# Patient Record
Sex: MALE | Race: WHITE | NOT HISPANIC OR LATINO | Employment: OTHER | URBAN - METROPOLITAN AREA
[De-identification: names, ages, dates, MRNs, and addresses within clinical notes are randomized per-mention and may not be internally consistent; named-entity substitution may affect disease eponyms.]

---

## 2022-04-06 ENCOUNTER — OFFICE VISIT (OUTPATIENT)
Dept: ENDOCRINOLOGY | Facility: HOSPITAL | Age: 82
End: 2022-04-06
Payer: MEDICARE

## 2022-04-06 VITALS
HEART RATE: 65 BPM | OXYGEN SATURATION: 98 % | BODY MASS INDEX: 29.12 KG/M2 | DIASTOLIC BLOOD PRESSURE: 72 MMHG | HEIGHT: 72 IN | WEIGHT: 215 LBS | SYSTOLIC BLOOD PRESSURE: 118 MMHG

## 2022-04-06 DIAGNOSIS — E55.9 VITAMIN D DEFICIENCY: ICD-10-CM

## 2022-04-06 DIAGNOSIS — N20.0 RENAL STONES: ICD-10-CM

## 2022-04-06 DIAGNOSIS — E34.9 ELEVATED PARATHYROID HORMONE: Primary | ICD-10-CM

## 2022-04-06 DIAGNOSIS — E21.3 HYPERPARATHYROIDISM, UNSPECIFIED (HCC): ICD-10-CM

## 2022-04-06 PROBLEM — R79.89 ELEVATED PARATHYROID HORMONE: Status: ACTIVE | Noted: 2022-04-06

## 2022-04-06 PROCEDURE — 99205 OFFICE O/P NEW HI 60 MIN: CPT | Performed by: INTERNAL MEDICINE

## 2022-04-06 RX ORDER — HYDROXYCHLOROQUINE SULFATE 200 MG/1
200 TABLET, FILM COATED ORAL 2 TIMES DAILY
COMMUNITY
Start: 2022-03-25

## 2022-04-06 RX ORDER — PREDNISONE 1 MG/1
10 TABLET ORAL
COMMUNITY

## 2022-04-06 RX ORDER — ATORVASTATIN CALCIUM 10 MG/1
10 TABLET, FILM COATED ORAL DAILY
COMMUNITY
Start: 2022-01-21

## 2022-04-06 RX ORDER — UBIDECARENONE 100 MG
CAPSULE ORAL DAILY
COMMUNITY

## 2022-04-06 RX ORDER — LISINOPRIL 20 MG/1
20 TABLET ORAL 2 TIMES DAILY
COMMUNITY
Start: 2022-03-07

## 2022-04-06 RX ORDER — APIXABAN 5 MG/1
5 TABLET, FILM COATED ORAL 2 TIMES DAILY
COMMUNITY
Start: 2022-04-01

## 2022-04-06 RX ORDER — POTASSIUM CHLORIDE 1500 MG/1
TABLET, FILM COATED, EXTENDED RELEASE ORAL
COMMUNITY
Start: 2022-02-23

## 2022-04-06 RX ORDER — FUROSEMIDE 40 MG/1
40 TABLET ORAL 2 TIMES DAILY
COMMUNITY
Start: 2022-03-24

## 2022-04-06 RX ORDER — NIFEDIPINE 30 MG/1
30 TABLET, EXTENDED RELEASE ORAL DAILY
COMMUNITY
Start: 2022-02-23

## 2022-04-06 RX ORDER — GABAPENTIN 100 MG/1
200 CAPSULE ORAL 2 TIMES DAILY
COMMUNITY
Start: 2022-01-21

## 2022-04-06 NOTE — PATIENT INSTRUCTIONS
Let's do the repeat blood work now  Let's do a 24 hour urine calcium  Let's do a dexa scan to check if the bones are weak  Follow up to be determined

## 2022-04-06 NOTE — PROGRESS NOTES
4/7/2022    Assessment/Plan      Diagnoses and all orders for this visit:    Elevated parathyroid hormone  -     Comprehensive metabolic panel Lab Collect  -     Phosphorus Lab Collect  -     PTH, intact Lab Collect Lab Collect  -     Vitamin D 25 hydroxy Lab Collect  -     Calcium, urine, 24 hour Lab Collect; Future  -     Creatinine, urine, 24 hour- Lab Collect; Future  -     DXA bone density spine hip and pelvis; Future    Vitamin D deficiency  -     Comprehensive metabolic panel Lab Collect  -     Phosphorus Lab Collect  -     PTH, intact Lab Collect Lab Collect  -     Vitamin D 25 hydroxy Lab Collect  -     Calcium, urine, 24 hour Lab Collect; Future  -     Creatinine, urine, 24 hour- Lab Collect; Future  -     DXA bone density spine hip and pelvis; Future    Renal stones  -     Comprehensive metabolic panel Lab Collect  -     Phosphorus Lab Collect  -     PTH, intact Lab Collect Lab Collect  -     Vitamin D 25 hydroxy Lab Collect  -     Calcium, urine, 24 hour Lab Collect; Future  -     Creatinine, urine, 24 hour- Lab Collect; Future    [facility], [facility] ([facility] )   -     DXA bone density spine hip and pelvis; Future    Other orders  -     Eliquis 5 MG; Take 5 mg by mouth 2 (two) times a day  -     atorvastatin (LIPITOR) 10 mg tablet; Take 10 mg by mouth daily  -     Cholecalciferol 25 MCG (1000 UT) tablet; Take 1,000 Units by mouth daily  -     Chromium 500 MCG TABS; Take by mouth  -     co-enzyme Q-10 100 mg capsule; Take by mouth daily  -     cyanocobalamin (VITAMIN B-12) 1000 MCG tablet; Take 1,000 mcg by mouth daily  -     furosemide (LASIX) 40 mg tablet; Take 40 mg by mouth 2 (two) times a day  -     gabapentin (NEURONTIN) 100 mg capsule; Take 200 mg by mouth 2 (two) times a day  -     hydroxychloroquine (PLAQUENIL) 200 mg tablet; Take 200 mg by mouth 2 (two) times a day  -     lisinopril (ZESTRIL) 20 mg tablet;  Take 20 mg by mouth 2 (two) times a day  -     NIFEdipine (PROCARDIA XL) 30 mg 24 hr tablet; Take 30 mg by mouth daily  -     Potassium Chloride ER 20 MEQ TBCR; Once daily  -     PUMPKIN SEED PO; Take by mouth  -     Apoaequorin (PREVAGEN PO); Take by mouth  -     predniSONE 5 mg tablet; Take 10 mg by mouth daily at bedtime        Assessment/Plan:  1  Elevated parathyroid hormone level  He has an elevated parathyroid hormone level with normal phosphorus and calcium  He could have secondary hyperparathyroidism due to vitamin-D deficiency  He could also have normocalcemic hyperparathyroidism  He does have a history of renal stones which could be partly due to his elevated parathyroid hormone level especially if his calcium excretion in his urine is elevated  To further evaluate this situation, I will do some blood work now consisting of a parathyroid hormone level, 25 hydroxy vitamin-D level, CMP, and phosphorus  I will also have him do a 24 hour urine for urinary free calcium  I will also have him do a DEXA scan given his kyphosis  2  Vitamin-D deficiency  He reportedly had a low vitamin-D level but I do not have those results  I will try to get those results  I will also have him get a 25 hydroxy vitamin-D level done now  3  History of renal stones  He has had 1 renal stone in the past   I will do a 24 hour urine for urinary free calcium  He will get his blood work done now along with his 24 hour urine and DEXA scan  Once the studies are obtained, I will then determine follow-up and further treatment  CC:  Parathyroid consult    History of Present Illness     HPI: Katheryn Heredia is a 78y o  year old male with history of elevated parathyroid hormone level here for evaluation and consult  He reports that he has a history of arthralgias and chest aches and swelling in his hands and legs and was seen by Rheumatology  Blood work was done to evaluate since he was placed on prednisone and Plaquenil now and had some sweating at times    A parathyroid hormone level was noted to be high  Calcium was normal   He was referred to Endocrinology for further evaluation  Of note, he has no history or family history of hyperparathyroidism  He has had 1 renal stone in the past that is not past as it was noted on surveillance ultrasound for his history of renal cancer was 20% of his right kidney was removed 12 years ago and he does follow with Urology at [facility]  He apparently has a brother with renal stones  He has never had any fractures  He does have a history of lactose intolerance so his dietary calcium intake is sparse he will occasionally have some milk in his cereal or coffee almost daily  He will have occasional cheese or yogurt  He has daily dark green vegetables and occasional sardines  He does not take calcium pills but did start vitamin-D about 1000 units daily about 6 weeks ago reportedly when a vitamin-D level was low done by his primary care physician  He does not know if he has osteoporosis  He has lost about 6-7 lb of fluid weight since starting on prednisone in generally feels just much better  He does have some polyuria but no polydipsia  He has at least once a night nocturia  He has had some polyphagia since starting prednisone  He denies fatigue  He denies perioral numbness or tingling but has chronic numbness of the tips of his fingers and feels he has lost muscle tone over the years  He denies constipation or abdominal pain  He has pain from his knee to his ankle bilaterally with some burning that seems to be worse with walking  He has some low-back pain  He did slip on the ice and fall on his hip and left arm but did not fracture anything recently  He denies any mental confusion  Review of Systems   Constitutional: Negative for fatigue and unexpected weight change  Weight down 6-7 lbs fluid since on prednisone  Feels much better in general     HENT: Positive for hearing loss  Negative for tinnitus and trouble swallowing  Has hearing aides bilaterally  Sometimes throat feels tight or swollen with swallowing, goes away if continues to eat  No XRT to the head or neck in the past    Eyes: Negative for visual disturbance  No diplopia    Respiratory: Negative for chest tightness and shortness of breath  Cardiovascular: Positive for leg swelling  Negative for chest pain and palpitations  Anterior across chest muscular skeletal/nerve pain for years, takes gabapentin  Gastrointestinal: Negative for abdominal pain, constipation, diarrhea and nausea  Endocrine: Positive for polyphagia and polyuria  Negative for polydipsia  Some occasional polyuria  Nocturia usually once a night every other night, will have nights of getting up 3-4 times a Night  Got night sweats with the first couple of nights of prednisone  Musculoskeletal: Positive for arthralgias  Negative for back pain  Slipped on ice and feel on elbow and hip, no fractures  Pain from the knee to ankles bilaterally and burning  Can be worse with walking and will get better over time if continues to push it  Going to cardiac rehab twice a d week  Some low back pain  Skin: Negative for rash  No dry skin  Neurological: Positive for numbness and headaches  Negative for dizziness, tremors, weakness and light-headedness  Feels occasional headaches  Numbness of the tips of the fingers  Has lost muscle tone over the last several years  Psychiatric/Behavioral: Negative for confusion, dysphoric mood and sleep disturbance  The patient is not nervous/anxious  Some normal age related forgetfulness          Historical Information   Past Medical History:   Diagnosis Date    [facility]     BPH (benign prostatic hyperplasia)     Duodenal ulcer 1980    GERD (gastroesophageal reflux disease)     Hyperlipidemia     Hypertension     Lyme disease 2013    Osteoarthritis     Polyneuropathy     idiopathic progressive  [facility] 2010    [facility]     Sleep apnea      Past Surgical History:   Procedure Laterality Date    CHOLECYSTECTOMY      LAPAROSCOPIC PARTIAL NEPHRECTOMY Right     20% removed for renal cancer, may have appendix    TONSILLECTOMY      TOTAL KNEE ARTHROPLASTY Right 2016     Social History   Social History     Substance and Sexual Activity   Alcohol Use Yes    Comment: glass wine 2-3 times a week     Social History     Substance and Sexual Activity   Drug Use Never     Social History     Tobacco Use   Smoking Status Never Smoker   Smokeless Tobacco Never Used     Family History:   Family History   Problem Relation Age of Onset    Hypertension Mother     Diabetes type II Mother     Obesity Mother     No Known Problems Father     Diabetes type II Brother     Nephrolithiasis Brother     Hemochromatosis Sister     Other Sister         blood cancer    No Known Problems Brother     Thyroid disease unspecified Daughter     Stomach cancer Son        Meds/Allergies   Current Outpatient Medications   Medication Sig Dispense Refill    Apoaequorin (PREVAGEN PO) Take by mouth      atorvastatin (LIPITOR) 10 mg tablet Take 10 mg by mouth daily      Cholecalciferol 25 MCG (1000 UT) tablet Take 1,000 Units by mouth daily      Chromium 500 MCG TABS Take by mouth      co-enzyme Q-10 100 mg capsule Take by mouth daily      cyanocobalamin (VITAMIN B-12) 1000 MCG tablet Take 1,000 mcg by mouth daily      Eliquis 5 MG Take 5 mg by mouth 2 (two) times a day      furosemide (LASIX) 40 mg tablet Take 40 mg by mouth 2 (two) times a day      gabapentin (NEURONTIN) 100 mg capsule Take 200 mg by mouth 2 (two) times a day      hydroxychloroquine (PLAQUENIL) 200 mg tablet Take 200 mg by mouth 2 (two) times a day      lisinopril (ZESTRIL) 20 mg tablet Take 20 mg by mouth 2 (two) times a day      NIFEdipine (PROCARDIA XL) 30 mg 24 hr tablet Take 30 mg by mouth daily      Potassium Chloride ER 20 MEQ TBCR Once daily      predniSONE 5 mg tablet Take 10 mg by mouth daily at bedtime      PUMPKIN SEED PO Take by mouth       No current facility-administered medications for this visit  Allergies   Allergen Reactions    Lactose - Food Allergy GI Intolerance       Objective   Vitals: Blood pressure 118/72, pulse 65, height 6' (1 829 m), weight 97 5 kg (215 lb), SpO2 98 %  Invasive Devices  Report    None                 Physical Exam  Vitals reviewed  Constitutional:       Appearance: Normal appearance  He is well-developed  HENT:      Head: Normocephalic and atraumatic  Comments: Negative [place] sign  Eyes:      Extraocular Movements: Extraocular movements intact  Conjunctiva/sclera: Conjunctivae normal       Comments: No lid lag, stare, proptosis or periorbital edema  Neck:      Thyroid: No thyromegaly  Vascular: No carotid bruit  Comments: Thyroid normal in size  No palpable thyroid nodules  No bruits over the thyroid gland  No masses of the neck  Cardiovascular:      Rate and Rhythm: Normal rate and regular rhythm  Heart sounds: Murmur heard  Comments: 3/6 systolic murmur  Pulmonary:      Effort: Pulmonary effort is normal       Breath sounds: Normal breath sounds  No wheezing  Abdominal:      General: Bowel sounds are normal       Palpations: Abdomen is soft  Tenderness: There is no abdominal tenderness  Musculoskeletal:         General: No deformity  Cervical back: Normal range of motion and neck supple  Right lower leg: Edema present  Left lower leg: Edema present  Comments: 1-2+ bilateral lower extremity edema  Has kyphosis  Tender to palpation over the spine  No CVA tenderness  No tremor of the outstretched hands   Lymphadenopathy:      Cervical: No cervical adenopathy  Skin:     General: Skin is warm and dry  Findings: No erythema or rash     Neurological:      Mental Status: He is alert and oriented to person, place, and time  Deep Tendon Reflexes: Reflexes are normal and symmetric  Comments: Deep tendon reflexes normal at the patellar area  The history was obtained from the review of the chart and from the patient  Lab Results:      Blood work done on 03/01/2022 at [address] [place] [address] showed an intact parathyroid hormone level of 124 with a calcium of 9 9     TSH is 2 2  CMP showed a calcium of 9 7 with albumin of 4 3 which corrects the calcium down to about 9 46  Phosphorus was 3 7 and magnesium was slightly high at 2 5  Creatinine was 0 91  CBC demonstrates a hemoglobin of 13 but was otherwise normal       No future appointments

## 2022-04-06 NOTE — LETTER
April 7, 2022     [facility], [address] [place] [address] [address] [facility] [id number]    Patient: Gabe Mcdonald   YOB: 1940   Date of Visit: 4/6/2022       Dear Dr Nigel Vazquez: Thank you for referring Rylan Bansal to me for evaluation  Below are my notes for this consultation  If you have questions, please do not hesitate to call me  I look forward to following your patient along with you  Sincerely,        Estuardo Salcedo MD        CC: No Recipients  Estuardo Salcedo MD  4/7/2022  7:23 AM  Sign when Signing Visit  4/7/2022    Assessment/Plan      Diagnoses and all orders for this visit:    Elevated parathyroid hormone  -     Comprehensive metabolic panel Lab Collect  -     Phosphorus Lab Collect  -     PTH, intact Lab Collect Lab Collect  -     Vitamin D 25 hydroxy Lab Collect  -     Calcium, urine, 24 hour Lab Collect; Future  -     Creatinine, urine, 24 hour- Lab Collect; Future  -     DXA bone density spine hip and pelvis; Future    Vitamin D deficiency  -     Comprehensive metabolic panel Lab Collect  -     Phosphorus Lab Collect  -     PTH, intact Lab Collect Lab Collect  -     Vitamin D 25 hydroxy Lab Collect  -     Calcium, urine, 24 hour Lab Collect; Future  -     Creatinine, urine, 24 hour- Lab Collect; Future  -     DXA bone density spine hip and pelvis; Future    Renal stones  -     Comprehensive metabolic panel Lab Collect  -     Phosphorus Lab Collect  -     PTH, intact Lab Collect Lab Collect  -     Vitamin D 25 hydroxy Lab Collect  -     Calcium, urine, 24 hour Lab Collect; Future  -     Creatinine, urine, 24 hour- Lab Collect; Future    Hyperparathyroidism, unspecified ([facility] )   -     DXA bone density spine hip and pelvis; Future    Other orders  -     Eliquis 5 MG; Take 5 mg by mouth 2 (two) times a day  -     atorvastatin (LIPITOR) 10 mg tablet; Take 10 mg by mouth daily  -     Cholecalciferol 25 MCG (1000 UT) tablet;  Take 1,000 Units by mouth daily  - Chromium 500 MCG TABS; Take by mouth  -     co-enzyme Q-10 100 mg capsule; Take by mouth daily  -     cyanocobalamin (VITAMIN B-12) 1000 MCG tablet; Take 1,000 mcg by mouth daily  -     furosemide (LASIX) 40 mg tablet; Take 40 mg by mouth 2 (two) times a day  -     gabapentin (NEURONTIN) 100 mg capsule; Take 200 mg by mouth 2 (two) times a day  -     hydroxychloroquine (PLAQUENIL) 200 mg tablet; Take 200 mg by mouth 2 (two) times a day  -     lisinopril (ZESTRIL) 20 mg tablet; Take 20 mg by mouth 2 (two) times a day  -     NIFEdipine (PROCARDIA XL) 30 mg 24 hr tablet; Take 30 mg by mouth daily  -     Potassium Chloride ER 20 MEQ TBCR; Once daily  -     PUMPKIN SEED PO; Take by mouth  -     Apoaequorin (PREVAGEN PO); Take by mouth  -     predniSONE 5 mg tablet; Take 10 mg by mouth daily at bedtime        Assessment/Plan:  1  Elevated parathyroid hormone level  He has an elevated parathyroid hormone level with normal phosphorus and calcium  He could have secondary hyperparathyroidism due to vitamin-D deficiency  He could also have normocalcemic hyperparathyroidism  He does have a history of renal stones which could be partly due to his elevated parathyroid hormone level especially if his calcium excretion in his urine is elevated  To further evaluate this situation, I will do some blood work now consisting of a parathyroid hormone level, 25 hydroxy vitamin-D level, CMP, and phosphorus  I will also have him do a 24 hour urine for urinary free calcium  I will also have him do a DEXA scan given his kyphosis  2  Vitamin-D deficiency  He reportedly had a low vitamin-D level but I do not have those results  I will try to get those results  I will also have him get a 25 hydroxy vitamin-D level done now  3  History of renal stones  He has had 1 renal stone in the past   I will do a 24 hour urine for urinary free calcium  He will get his blood work done now along with his 24 hour urine and DEXA scan  Once the studies are obtained, I will then determine follow-up and further treatment  CC:  Parathyroid consult    History of Present Illness     HPI: Damien Pak is a 78y o  year old male with history of elevated parathyroid hormone level here for evaluation and consult  He reports that he has a history of arthralgias and chest aches and swelling in his hands and legs and was seen by Rheumatology  Blood work was done to evaluate since he was placed on prednisone and Plaquenil now and had some sweating at times  A parathyroid hormone level was noted to be high  Calcium was normal   He was referred to Endocrinology for further evaluation  Of note, he has no history or family history of hyperparathyroidism  He has had 1 renal stone in the past that is not past as it was noted on surveillance ultrasound for his history of renal cancer was 20% of his right kidney was removed 12 years ago and he does follow with Urology at [facility]  He apparently has a brother with renal stones  He has never had any fractures  He does have a history of lactose intolerance so his dietary calcium intake is sparse he will occasionally have some milk in his cereal or coffee almost daily  He will have occasional cheese or yogurt  He has daily dark green vegetables and occasional sardines  He does not take calcium pills but did start vitamin-D about 1000 units daily about 6 weeks ago reportedly when a vitamin-D level was low done by his primary care physician  He does not know if he has osteoporosis  He has lost about 6-7 lb of fluid weight since starting on prednisone in generally feels just much better  He does have some polyuria but no polydipsia  He has at least once a night nocturia  He has had some polyphagia since starting prednisone  He denies fatigue  He denies perioral numbness or tingling but has chronic numbness of the tips of his fingers and feels he has lost muscle tone over the years    He denies constipation or abdominal pain  He has pain from his knee to his ankle bilaterally with some burning that seems to be worse with walking  He has some low-back pain  He did slip on the ice and fall on his hip and left arm but did not fracture anything recently  He denies any mental confusion  Review of Systems   Constitutional: Negative for fatigue and unexpected weight change  Weight down 6-7 lbs fluid since on prednisone  Feels much better in general     HENT: Positive for hearing loss  Negative for tinnitus and trouble swallowing  Has hearing aides bilaterally  Sometimes throat feels tight or swollen with swallowing, goes away if continues to eat  No XRT to the head or neck in the past    Eyes: Negative for visual disturbance  No diplopia    Respiratory: Negative for chest tightness and shortness of breath  Cardiovascular: Positive for leg swelling  Negative for chest pain and palpitations  Anterior across chest muscular skeletal/nerve pain for years, takes gabapentin  Gastrointestinal: Negative for abdominal pain, constipation, diarrhea and nausea  Endocrine: Positive for polyphagia and polyuria  Negative for polydipsia  Some occasional polyuria  Nocturia usually once a night every other night, will have nights of getting up 3-4 times a Night  Got night sweats with the first couple of nights of prednisone  Musculoskeletal: Positive for arthralgias  Negative for back pain  Slipped on ice and feel on elbow and hip, no fractures  Pain from the knee to ankles bilaterally and burning  Can be worse with walking and will get better over time if continues to push it  Going to cardiac rehab twice a d week  Some low back pain  Skin: Negative for rash  No dry skin  Neurological: Positive for numbness and headaches  Negative for dizziness, tremors, weakness and light-headedness  Feels occasional headaches   Numbness of the tips of the fingers  Has lost muscle tone over the last several years  Psychiatric/Behavioral: Negative for confusion, dysphoric mood and sleep disturbance  The patient is not nervous/anxious  Some normal age related forgetfulness          Historical Information   Past Medical History:   Diagnosis Date    [facility]     BPH (benign prostatic hyperplasia)     Duodenal ulcer 1980    GERD (gastroesophageal reflux disease)     Hyperlipidemia     Hypertension     Lyme disease 2013    Osteoarthritis     Polyneuropathy     idiopathic progressive    Renal cancer ([facility] ) 2010    Rheumatoid arthritis ([facility] )     Sleep apnea      Past Surgical History:   Procedure Laterality Date    CHOLECYSTECTOMY      LAPAROSCOPIC PARTIAL NEPHRECTOMY Right     20% removed for renal cancer, may have appendix    TONSILLECTOMY      TOTAL KNEE ARTHROPLASTY Right 2016     Social History   Social History     Substance and Sexual Activity   Alcohol Use Yes    Comment: glass wine 2-3 times a week     Social History     Substance and Sexual Activity   Drug Use Never     Social History     Tobacco Use   Smoking Status Never Smoker   Smokeless Tobacco Never Used     Family History:   Family History   Problem Relation Age of Onset    Hypertension Mother     Diabetes type II Mother     Obesity Mother     No Known Problems Father     Diabetes type II Brother     Nephrolithiasis Brother     Hemochromatosis Sister     Other Sister         blood cancer    No Known Problems Brother     Thyroid disease unspecified Daughter     Stomach cancer Son        Meds/Allergies   Current Outpatient Medications   Medication Sig Dispense Refill    Apoaequorin (PREVAGEN PO) Take by mouth      atorvastatin (LIPITOR) 10 mg tablet Take 10 mg by mouth daily      Cholecalciferol 25 MCG (1000 UT) tablet Take 1,000 Units by mouth daily      Chromium 500 MCG TABS Take by mouth      co-enzyme Q-10 100 mg capsule Take by mouth daily      cyanocobalamin (VITAMIN B-12) 1000 MCG tablet Take 1,000 mcg by mouth daily      Eliquis 5 MG Take 5 mg by mouth 2 (two) times a day      furosemide (LASIX) 40 mg tablet Take 40 mg by mouth 2 (two) times a day      gabapentin (NEURONTIN) 100 mg capsule Take 200 mg by mouth 2 (two) times a day      hydroxychloroquine (PLAQUENIL) 200 mg tablet Take 200 mg by mouth 2 (two) times a day      lisinopril (ZESTRIL) 20 mg tablet Take 20 mg by mouth 2 (two) times a day      NIFEdipine (PROCARDIA XL) 30 mg 24 hr tablet Take 30 mg by mouth daily      Potassium Chloride ER 20 MEQ TBCR Once daily      predniSONE 5 mg tablet Take 10 mg by mouth daily at bedtime      PUMPKIN SEED PO Take by mouth       No current facility-administered medications for this visit  Allergies   Allergen Reactions    Lactose - Food Allergy GI Intolerance       Objective   Vitals: Blood pressure 118/72, pulse 65, height 6' (1 829 m), weight 97 5 kg (215 lb), SpO2 98 %  Invasive Devices  Report    None                 Physical Exam  Vitals reviewed  Constitutional:       Appearance: Normal appearance  He is well-developed  HENT:      Head: Normocephalic and atraumatic  Comments: Negative [place] sign  Eyes:      Extraocular Movements: Extraocular movements intact  Conjunctiva/sclera: Conjunctivae normal       Comments: No lid lag, stare, proptosis or periorbital edema  Neck:      Thyroid: No thyromegaly  Vascular: No carotid bruit  Comments: Thyroid normal in size  No palpable thyroid nodules  No bruits over the thyroid gland  No masses of the neck  Cardiovascular:      Rate and Rhythm: Normal rate and regular rhythm  Heart sounds: Murmur heard  Comments: 3/6 systolic murmur  Pulmonary:      Effort: Pulmonary effort is normal       Breath sounds: Normal breath sounds  No wheezing  Abdominal:      General: Bowel sounds are normal       Palpations: Abdomen is soft  Tenderness:  There is no abdominal tenderness  Musculoskeletal:         General: No deformity  Cervical back: Normal range of motion and neck supple  Right lower leg: Edema present  Left lower leg: Edema present  Comments: 1-2+ bilateral lower extremity edema  Has kyphosis  Tender to palpation over the spine  No CVA tenderness  No tremor of the outstretched hands   Lymphadenopathy:      Cervical: No cervical adenopathy  Skin:     General: Skin is warm and dry  Findings: No erythema or rash  Neurological:      Mental Status: He is alert and oriented to person, place, and time  Deep Tendon Reflexes: Reflexes are normal and symmetric  Comments: Deep tendon reflexes normal at the patellar area  The history was obtained from the review of the chart and from the patient  Lab Results:      Blood work done on 03/01/2022 at [address] [place] [address] showed an intact parathyroid hormone level of 124 with a calcium of 9 9     TSH is 2 2  CMP showed a calcium of 9 7 with albumin of 4 3 which corrects the calcium down to about 9 46  Phosphorus was 3 7 and magnesium was slightly high at 2 5  Creatinine was 0 91  CBC demonstrates a hemoglobin of 13 but was otherwise normal       No future appointments

## 2022-04-13 ENCOUNTER — TELEPHONE (OUTPATIENT)
Dept: ENDOCRINOLOGY | Facility: HOSPITAL | Age: 82
End: 2022-04-13

## 2022-04-13 NOTE — TELEPHONE ENCOUNTER
Received voicemail from patient request lab results of his recent 24 hour urine  He will also need the results sent to another doctor when we call back

## 2022-04-15 NOTE — TELEPHONE ENCOUNTER
His 24 hour urine test does show elevated calcium in the urine at 345 mg in a 24 hour  Normal is less than 200  His DEXA scan does show significant worsening of his bone mineral density and weakening of his bones  His vitamin-D level is normal his CMP is normal but I do not have his intact parathyroid hormone level yet  Can we call [place] for this result

## 2022-04-19 NOTE — TELEPHONE ENCOUNTER
Patient advised and scheduled for 4-22-22  He requested that we send his rheumatolgist the [address] & lab results  Faxed them to Dr Shea Riedel in [place] (fax [phone])

## 2022-04-22 ENCOUNTER — OFFICE VISIT (OUTPATIENT)
Dept: ENDOCRINOLOGY | Facility: HOSPITAL | Age: 82
End: 2022-04-22
Payer: MEDICARE

## 2022-04-22 ENCOUNTER — TELEPHONE (OUTPATIENT)
Dept: ENDOCRINOLOGY | Facility: HOSPITAL | Age: 82
End: 2022-04-22

## 2022-04-22 VITALS
HEART RATE: 68 BPM | DIASTOLIC BLOOD PRESSURE: 80 MMHG | WEIGHT: 216.4 LBS | BODY MASS INDEX: 29.31 KG/M2 | SYSTOLIC BLOOD PRESSURE: 122 MMHG | HEIGHT: 72 IN

## 2022-04-22 DIAGNOSIS — E21.3 HYPERPARATHYROIDISM (HCC): Primary | ICD-10-CM

## 2022-04-22 DIAGNOSIS — N20.0 RENAL STONES: ICD-10-CM

## 2022-04-22 DIAGNOSIS — M81.8 OTHER OSTEOPOROSIS WITHOUT CURRENT PATHOLOGICAL FRACTURE: ICD-10-CM

## 2022-04-22 DIAGNOSIS — M85.852 OSTEOPENIA OF LEFT HIP: ICD-10-CM

## 2022-04-22 DIAGNOSIS — E55.9 VITAMIN D DEFICIENCY: ICD-10-CM

## 2022-04-22 PROBLEM — R79.89 ELEVATED PARATHYROID HORMONE: Status: RESOLVED | Noted: 2022-04-06 | Resolved: 2022-04-22

## 2022-04-22 PROBLEM — E34.9 ELEVATED PARATHYROID HORMONE: Status: RESOLVED | Noted: 2022-04-06 | Resolved: 2022-04-22

## 2022-04-22 PROBLEM — M85.88 OSTEOPENIA OF LUMBAR SPINE: Status: ACTIVE | Noted: 2022-04-22

## 2022-04-22 PROCEDURE — 99214 OFFICE O/P EST MOD 30 MIN: CPT | Performed by: INTERNAL MEDICINE

## 2022-04-22 NOTE — LETTER
April 22, 2022     Janay Schmitt, [address] [id number]    Patient: Stephon Leahy   YOB: 1940   Date of Visit: 4/22/2022       Dear Dr Griselda Sandman: Thank you for referring Stephon Leahy to me for evaluation  Below are my notes for this consultation  If you have questions, please do not hesitate to call me  I look forward to following your patient along with you  Sincerely,        Devendra Narvaez MD        CC: MD Devendra Hernandez MD  4/22/2022  4:00 PM  Sign when Signing Visit  4/22/2022    Assessment/Plan      Diagnoses and all orders for this visit:    Hyperparathyroidism ([address] )  -     PTH, intact Lab [facility]; Future  -     Phosphorus Lab Collect; Future  -     Comprehensive metabolic panel Lab Collect; Future  -     NM parathyroid scan w spect; Future    Osteopenia of left hip    Other osteoporosis without current pathological fracture    Vitamin D deficiency    Renal stones        Assessment/Plan:  1  Hyperparathyroidism  Blood work demonstrates a normal calcium with a markedly elevated parathyroid hormone level  He also has an elevation in 24 hour urine calcium and history of osteopenia and osteoporosis of the forearm  He also has a history of a renal calculi that is not yet moved  Based on all these factors, surgical treatment of his hyperparathyroidism is likely going to be the opportune step  Unfortunately, he is still on prednisone which could affect wound healing and is just starting to feel better via his rheumatologic disease so will give him a little bit of time to try to get off the prednisone and to stabilize his rheumatologic diseases  I will have him follow up in 3 months further evaluation  Any surgery will need to be planned conjunction with his rheumatologist and his cardiologist since he is on Eliquis  2  Vitamin-D deficiency    His current vitamin-D level is normal   Continue same vitamin-D 1000 units daily  3  Renal calculi  He has 1 nonmobile renal calculi seen on ultrasound  He is following urology at [facility]  3  Osteopenia and osteoporosis  Hopefully, this will improve in the future once his parathyroid levels are stable  I would recommend discontinuing prednisone as early as possible via his rheumatologist   Continue same vitamin-D replacement  Studies and treatment options were discussed with the patient in the office today  30 minutes was spent on this discussion  All questions were answered  Further medications and treatment will be based on blood work if indicated  I have asked him to follow up in 3 months with preceding CMP, phosphorus, intact parathyroid hormone level and parathyroid scan  CC:  Hyperparathyroidism, vitamin-D deficiency, osteoporosis follow-up    History of Present Illness     HPI: Andressa Knight is a 78y o  year old male with history of hyperparathyroidism here for follow up  He is here strictly to go over blood work and studies to determine the next course of action  Review of Systems was not performed as he was here to discuss blood work and treatment options      Historical Information   Past Medical History:   Diagnosis Date    Atrial fibrillation ([facility] [address] )     BPH (benign prostatic hyperplasia)     Duodenal ulcer 1980    GERD (gastroesophageal reflux disease)     Hyperlipidemia     Hypertension     Lyme disease 2013    Osteoarthritis     Polyneuropathy     idiopathic progressive    Renal cancer ([address] ) 2010    Rheumatoid arthritis ([facility] )     Sleep apnea      Past Surgical History:   Procedure Laterality Date    CHOLECYSTECTOMY      LAPAROSCOPIC PARTIAL NEPHRECTOMY Right     20% removed for renal cancer, may have appendix    TONSILLECTOMY      TOTAL KNEE ARTHROPLASTY Right 2016     Social History   Social History     Substance and Sexual Activity   Alcohol Use Yes    Comment: glass wine 2-3 times a week Social History     Substance and Sexual Activity   Drug Use Never     Social History     Tobacco Use   Smoking Status Never Smoker   Smokeless Tobacco Never Used     Family History:   Family History   Problem Relation Age of Onset    Hypertension Mother     Diabetes type II Mother     Obesity Mother     No Known Problems Father     Diabetes type II Brother     Nephrolithiasis Brother     Hemochromatosis Sister     Other Sister         blood cancer    No Known Problems Brother     Thyroid disease unspecified Daughter     Stomach cancer Son        Meds/Allergies   Current Outpatient Medications   Medication Sig Dispense Refill    Apoaequorin (PREVAGEN PO) Take by mouth      atorvastatin (LIPITOR) 10 mg tablet Take 10 mg by mouth daily      Cholecalciferol 25 MCG (1000 UT) tablet Take 1,000 Units by mouth daily      Chromium 500 MCG TABS Take by mouth      co-enzyme Q-10 100 mg capsule Take by mouth daily      cyanocobalamin (VITAMIN B-12) 1000 MCG tablet Take 1,000 mcg by mouth daily      Eliquis 5 MG Take 5 mg by mouth 2 (two) times a day      furosemide (LASIX) 40 mg tablet Take 40 mg by mouth 2 (two) times a day      gabapentin (NEURONTIN) 100 mg capsule Take 200 mg by mouth 2 (two) times a day      hydroxychloroquine (PLAQUENIL) 200 mg tablet Take 200 mg by mouth 2 (two) times a day      lisinopril (ZESTRIL) 20 mg tablet Take 20 mg by mouth 2 (two) times a day      NIFEdipine (PROCARDIA XL) 30 mg 24 hr tablet Take 30 mg by mouth daily      Potassium Chloride ER 20 MEQ TBCR Once daily      predniSONE 5 mg tablet Take 10 mg by mouth daily at bedtime      PUMPKIN SEED PO Take by mouth       No current facility-administered medications for this visit  Allergies   Allergen Reactions    Lactose - Food Allergy GI Intolerance       Objective   Vitals: Blood pressure 122/80, pulse 68, height 6' (1 829 m), weight 98 2 kg (216 lb 6 4 oz)    Invasive Devices  Report    None Physical Exam was not performed as he was here to discuss blood work and treatment options  The history was obtained from the review of the chart and from the patient  Lab Results:      Blood work done a [facility] on 04/06/2022 showed a CMP with a calcium of 9 2 in the setting of an albumin of 4 3 and a phosphorus of 3  Creatinine is normal at 1 09 with a BUN of 26  The rest of the CMP is normal       Intact parathyroid hormone level is 235  Twenty-five hydroxy vitamin-D level is 51 2       24 hour urine calcium is 345  DEXA scan:      DEXA scan performed on 04/08/2022 at [facility] demonstrates a bone mineral density of the lumbar spine of-0 1 which is 8 5% improved from previous  Bone mineral density of the left hip demonstrates a T-score of minus 2 1 consistent with osteopenia and a T-score of -2 5 in the proximal femur consistent with osteoporosis  Osteoporosis of the forearm is noted based on a bone mineral density of the left forearm with a T-score of-2 5        Future Appointments   Date Time Provider Diomedes Kulkarni   8/8/2022  2:40 PM Qasim Rollins MD [facility]

## 2022-04-22 NOTE — PATIENT INSTRUCTIONS
You have hyperparathyroidism and are at risk for more kidney stones and broken bones if you fall  We'll give the rheumatologist more time to keep that stable and maybe get you off the prednisone  We'll have you follow up in about 3 months with blood work and parathyroid nuclear scan  Hyperparathyroidism   AMBULATORY CARE:   Hyperparathyroidism  is a condition that causes your parathyroid glands to make too much parathyroid hormone (PTH)  The parathyroid glands are 4 small glands located near the thyroid gland in your neck  PTH keeps the level of calcium balanced in your blood  High levels of PTH causes too much calcium to build up in your blood  High calcium levels can cause health problems such as osteoporosis (weak, brittle bones), high blood pressure, and kidney stones  Common signs and symptoms of hyperparathyroidism include the following: You may have no signs or symptoms or you may have any of the following:  · Muscle weakness    · Fatigue    · Depression or anxiety    · General body aches and pains    · Bone and joint pain    · Loss of appetite    · Nausea, vomiting, or abdominal pain    · Constipation    · Confusion or forgetfulness     · Increased thirst and urination    Seek care immediately if:   · You heart beats faster or slower than normal, or it feels like fluttering in your chest     · You have nausea, vomiting, and abdominal pain  · You cannot think clearly  Contact your healthcare provider if:   · You have bone and joint pain  · You have increased thirst or you are urinating more often than usual     · You have a loss of appetite  · You have questions or concerns about your condition or care  Treatment:  You may not need any treatment if you do not have symptoms  Your healthcare provider may monitor your condition through regular visits and blood tests  You may need medicines to control the amount of PTH your parathyroid glands make   You may also need medicine to keep your bones strong  Surgery may be done to remove an adenoma or your parathyroid glands  Manage hyperparathyroidism: You may need to do the following if you will not have surgery to treat your hyperparathyroidism  · Limit your calcium intake  Your healthcare provider may tell you to limit your intake to less than 1200 mg each day  You may also need to limit vitamin D to less than 600 IU each day  · Drink liquids as directed  Liquids can help prevent kidney stones  Ask how much liquid to drink each day and which liquids are best for you  · Exercise regularly  Ask your healthcare provider about the best exercise plan for you  Exercise can help build and strengthen your bones  Follow up with your doctor as directed:  Write down your questions so you remember to ask them during your visits  © Copyright [employer] 2022 Information is for End User's use only and may not be sold, redistributed or otherwise used for commercial purposes  All illustrations and images included in CareNotes® are the copyrighted property of A D A M , Inc  or [facility] Louisa Armendariz   The above information is an  only  It is not intended as medical advice for individual conditions or treatments  Talk to your doctor, nurse or pharmacist before following any medical regimen to see if it is safe and effective for you

## 2022-04-22 NOTE — PROGRESS NOTES
4/22/2022    Assessment/Plan      Diagnoses and all orders for this visit:    Hyperparathyroidism ([address] )  -     PTH, intact Lab Collect Lab Collect; Future  -     Phosphorus Lab Collect; Future  -     Comprehensive metabolic panel Lab Collect; Future  -     NM parathyroid scan w spect; Future    Osteopenia of left hip    Other osteoporosis without current pathological fracture    Vitamin D deficiency    Renal stones        Assessment/Plan:  1  Hyperparathyroidism  Blood work demonstrates a normal calcium with a markedly elevated parathyroid hormone level  He also has an elevation in 24 hour urine calcium and history of osteopenia and osteoporosis of the forearm  He also has a history of a renal calculi that is not yet moved  Based on all these factors, surgical treatment of his hyperparathyroidism is likely going to be the opportune step  Unfortunately, he is still on prednisone which could affect wound healing and is just starting to feel better via his rheumatologic disease so will give him a little bit of time to try to get off the prednisone and to stabilize his rheumatologic diseases  I will have him follow up in 3 months further evaluation  Any surgery will need to be planned conjunction with his rheumatologist and his cardiologist since he is on Eliquis  2  Vitamin-D deficiency  His current vitamin-D level is normal   Continue same vitamin-D 1000 units daily  3  Renal calculi  He has 1 nonmobile renal calculi seen on ultrasound  He is following urology at [facility]  3  Osteopenia and osteoporosis  Hopefully, this will improve in the future once his parathyroid levels are stable  I would recommend discontinuing prednisone as early as possible via his rheumatologist   Continue same vitamin-D replacement  Studies and treatment options were discussed with the patient in the office today  30 minutes was spent on this discussion  All questions were answered        Further medications and treatment will be based on blood work if indicated  I have asked him to follow up in 3 months with preceding CMP, phosphorus, intact parathyroid hormone level and parathyroid scan  CC:  Hyperparathyroidism, vitamin-D deficiency, osteoporosis follow-up    History of Present Illness     HPI: Rose Roberts is a 78y o  year old male with history of hyperparathyroidism here for follow up  He is here strictly to go over blood work and studies to determine the next course of action  Review of Systems was not performed as he was here to discuss blood work and treatment options      Historical Information   Past Medical History:   Diagnosis Date    [facility]     BPH (benign prostatic hyperplasia)     Duodenal ulcer 1980    GERD (gastroesophageal reflux disease)     Hyperlipidemia     Hypertension     Lyme disease 2013    Osteoarthritis     Polyneuropathy     idiopathic progressive    Renal cancer ([facility] ) 2010    Rheumatoid arthritis ([facility] )     Sleep apnea      Past Surgical History:   Procedure Laterality Date    CHOLECYSTECTOMY      LAPAROSCOPIC PARTIAL NEPHRECTOMY Right     20% removed for renal cancer, may have appendix    TONSILLECTOMY      TOTAL KNEE ARTHROPLASTY Right 2016     Social History   Social History     Substance and Sexual Activity   Alcohol Use Yes    Comment: glass wine 2-3 times a week     Social History     Substance and Sexual Activity   Drug Use Never     Social History     Tobacco Use   Smoking Status Never Smoker   Smokeless Tobacco Never Used     Family History:   Family History   Problem Relation Age of Onset    Hypertension Mother     Diabetes type II Mother     Obesity Mother     No Known Problems Father     Diabetes type II Brother     Nephrolithiasis Brother     Hemochromatosis Sister     Other Sister         blood cancer    No Known Problems Brother     Thyroid disease unspecified Daughter     Stomach cancer Son        Meds/Allergies Current Outpatient Medications   Medication Sig Dispense Refill    Apoaequorin (PREVAGEN PO) Take by mouth      atorvastatin (LIPITOR) 10 mg tablet Take 10 mg by mouth daily      Cholecalciferol 25 MCG (1000 UT) tablet Take 1,000 Units by mouth daily      Chromium 500 MCG TABS Take by mouth      co-enzyme Q-10 100 mg capsule Take by mouth daily      cyanocobalamin (VITAMIN B-12) 1000 MCG tablet Take 1,000 mcg by mouth daily      Eliquis 5 MG Take 5 mg by mouth 2 (two) times a day      furosemide (LASIX) 40 mg tablet Take 40 mg by mouth 2 (two) times a day      gabapentin (NEURONTIN) 100 mg capsule Take 200 mg by mouth 2 (two) times a day      hydroxychloroquine (PLAQUENIL) 200 mg tablet Take 200 mg by mouth 2 (two) times a day      lisinopril (ZESTRIL) 20 mg tablet Take 20 mg by mouth 2 (two) times a day      NIFEdipine (PROCARDIA XL) 30 mg 24 hr tablet Take 30 mg by mouth daily      Potassium Chloride ER 20 MEQ TBCR Once daily      predniSONE 5 mg tablet Take 10 mg by mouth daily at bedtime      PUMPKIN SEED PO Take by mouth       No current facility-administered medications for this visit  Allergies   Allergen Reactions    Lactose - Food Allergy GI Intolerance       Objective   Vitals: Blood pressure 122/80, pulse 68, height 6' (1 829 m), weight 98 2 kg (216 lb 6 4 oz)  Invasive Devices  Report    None                 Physical Exam was not performed as he was here to discuss blood work and treatment options  The history was obtained from the review of the chart and from the patient  Lab Results:      Blood work done a [address] [place] [address] on 04/06/2022 showed a CMP with a calcium of 9 2 in the setting of an albumin of 4 3 and a phosphorus of 3  Creatinine is normal at 1 09 with a BUN of 26  The rest of the CMP is normal       Intact parathyroid hormone level is 235  Twenty-five hydroxy vitamin-D level is 51 2       24 hour urine calcium is 345      DEXA scan:      DEXA scan performed on 04/08/2022 at [facility] demonstrates a bone mineral density of the lumbar spine of-0 1 which is 8 5% improved from previous  Bone mineral density of the left hip demonstrates a T-score of minus 2 1 consistent with osteopenia and a T-score of -2 5 in the proximal femur consistent with osteoporosis  Osteoporosis of the forearm is noted based on a bone mineral density of the left forearm with a T-score of-2 5        Future Appointments   Date Time Provider Diomedes Kulkarni   8/8/2022  2:40 PM Qasim Rollins MD [facility]

## 2022-06-02 ENCOUNTER — HOSPITAL ENCOUNTER (OUTPATIENT)
Dept: NUCLEAR MEDICINE | Facility: HOSPITAL | Age: 82
Discharge: HOME | End: 2022-06-02
Attending: INTERNAL MEDICINE
Payer: MEDICARE

## 2022-06-02 DIAGNOSIS — E21.3 HYPERPARATHYROIDISM (HCC): ICD-10-CM

## 2022-06-02 PROCEDURE — 78071 PARATHYRD PLANAR W/WO SUBTRJ: CPT

## 2022-06-02 PROCEDURE — A9500 TC99M SESTAMIBI: HCPCS

## 2022-06-02 PROCEDURE — G1004 CDSM NDSC: HCPCS

## 2022-06-08 ENCOUNTER — TELEPHONE (OUTPATIENT)
Dept: ENDOCRINOLOGY | Facility: HOSPITAL | Age: 82
End: 2022-06-08

## 2022-06-08 NOTE — TELEPHONE ENCOUNTER
Patient aware  Results faxed to his rheumatologist Dr Betty Orozco at   Patient will wait on a referral  He would like to discuss with his rheumatologist and Dr Phuc Ambriz

## 2022-06-08 NOTE — TELEPHONE ENCOUNTER
Parathyroid scan shows possible right inferior parathyroid adenoma    Reviewing notes from Dr Tyson Bobo, it appears surgery is being considered so referral to a surgeon could be considered at this time

## 2022-06-08 NOTE — TELEPHONE ENCOUNTER
Patient is asking for his NM Parathyroid Scan  He does not want to wait till August for his results  Can you please advise

## 2022-06-20 NOTE — TELEPHONE ENCOUNTER
See below  Patient called back  He wants to discuss this with you personally  He asked that you call him when you get chance

## 2022-06-22 NOTE — TELEPHONE ENCOUNTER
Patient called today, please advise, do you know when you will be able to call him back? Copy of results mailed to patient

## 2022-06-24 DIAGNOSIS — E21.3 HYPERPARATHYROIDISM (HCC): Primary | ICD-10-CM

## 2022-06-24 NOTE — PROGRESS NOTES
Spoke with patient regarding his parathyroid scan which demonstrates likely right lower parathyroid adenoma  He is back on prednisone 5 mg daily per his rheumatologist and does still feel fatigue and grogginess  His leg pain and rheumatologic pain is helped a lot with the prednisone  Is interested in proceeding with the parathyroid surgery as we had discussed at his last visit  Even though his calcium is normal, his parathyroid hormone level is quite high and he does have evidence of a renal stone with elevated urine calcium and osteopenia and osteoporosis  I will refer him to surgery for parathyroidectomy

## 2022-07-06 ENCOUNTER — TELEPHONE (OUTPATIENT)
Dept: HEMATOLOGY ONCOLOGY | Facility: CLINIC | Age: 82
End: 2022-07-06

## 2022-07-06 NOTE — TELEPHONE ENCOUNTER
Patient called to inform office he is waiting to hear from his pcp and endocrinologist and his lab results before scheduling appt for Surg Onc

## 2022-08-01 ENCOUNTER — TELEPHONE (OUTPATIENT)
Dept: HEMATOLOGY ONCOLOGY | Facility: CLINIC | Age: 82
End: 2022-08-01

## 2022-08-04 ENCOUNTER — TELEPHONE (OUTPATIENT)
Dept: HEMATOLOGY ONCOLOGY | Facility: CLINIC | Age: 82
End: 2022-08-04

## 2022-08-04 NOTE — TELEPHONE ENCOUNTER
Attempt to schedule consult - Left Voicemail with [facility] number instructing patient to call back and schedule  Third attempt - referral closed   Letter sent to patient and referring provider notified

## 2024-02-01 ENCOUNTER — HOSPITAL ENCOUNTER (OUTPATIENT)
Dept: HOSPITAL 99 - RAD | Age: 84
End: 2024-02-01
Payer: MEDICARE

## 2024-02-01 DIAGNOSIS — I35.0: Primary | ICD-10-CM

## 2024-02-14 LAB
ALBUMIN SERPL-MCNC: 4 G/DL (ref 3.5–5)
ALP SERPL-CCNC: 77 U/L (ref 38–126)
ALT SERPL-CCNC: 18 U/L (ref 0–50)
APTT PPP: 34.7 SEC (ref 23.4–35)
AST SERPL-CCNC: 25 U/L (ref 17–59)
BUN SERPL-MCNC: 26 MG/DL (ref 9–20)
CALCIUM SERPL-MCNC: 10.3 MG/DL (ref 8.4–10.2)
CHLORIDE SERPL-SCNC: 102 MMOL/L (ref 98–107)
CO2 SERPL-SCNC: 29 MMOL/L (ref 22–30)
EGFR: > 60
ERYTHROCYTE [DISTWIDTH] IN BLOOD BY AUTOMATED COUNT: 14.6 % (ref 11.5–14.5)
ESTIMATED CREATININE CLEARANCE: 58 ML/MIN
GLUCOSE SERPL-MCNC: 107 MG/DL (ref 70–99)
HBA1C MFR BLD: 6.1 % (ref 4–5.6)
HCT VFR BLD AUTO: 41.7 % (ref 39–52)
HGB BLD-MCNC: 14.4 G/DL (ref 13–18)
INR PPP: 1.42
MCHC RBC AUTO-ENTMCNC: 34.5 G/DL (ref 33–37)
MCV RBC AUTO: 93.5 FL (ref 80–94)
NRBC BLD AUTO-RTO: 0 %
PLATELET # BLD AUTO: 225 10^3/UL (ref 130–400)
POTASSIUM SERPL-SCNC: 3.7 MMOL/L (ref 3.5–5.1)
PROT SERPL-MCNC: 6.3 G/DL (ref 6.3–8.2)
PROTHROMBIN TIME: 17.5 SEC (ref 11.4–14.6)
SODIUM SERPL-SCNC: 139 MMOL/L (ref 135–145)

## 2024-02-22 ENCOUNTER — HOSPITAL ENCOUNTER (INPATIENT)
Dept: HOSPITAL 99 - IVU | Age: 84
LOS: 2 days | Discharge: HOME | DRG: 267 | End: 2024-02-24
Payer: MEDICARE

## 2024-02-22 VITALS — DIASTOLIC BLOOD PRESSURE: 82 MMHG | SYSTOLIC BLOOD PRESSURE: 150 MMHG

## 2024-02-22 VITALS — DIASTOLIC BLOOD PRESSURE: 74 MMHG | SYSTOLIC BLOOD PRESSURE: 146 MMHG

## 2024-02-22 VITALS — RESPIRATION RATE: 21 BRPM | SYSTOLIC BLOOD PRESSURE: 159 MMHG | DIASTOLIC BLOOD PRESSURE: 83 MMHG

## 2024-02-22 VITALS — SYSTOLIC BLOOD PRESSURE: 143 MMHG | DIASTOLIC BLOOD PRESSURE: 79 MMHG

## 2024-02-22 VITALS — DIASTOLIC BLOOD PRESSURE: 83 MMHG | SYSTOLIC BLOOD PRESSURE: 147 MMHG

## 2024-02-22 VITALS — OXYGEN SATURATION: 2 %

## 2024-02-22 VITALS — RESPIRATION RATE: 11 BRPM

## 2024-02-22 VITALS — SYSTOLIC BLOOD PRESSURE: 139 MMHG | DIASTOLIC BLOOD PRESSURE: 79 MMHG

## 2024-02-22 VITALS — RESPIRATION RATE: 20 BRPM

## 2024-02-22 VITALS — DIASTOLIC BLOOD PRESSURE: 77 MMHG | RESPIRATION RATE: 18 BRPM | SYSTOLIC BLOOD PRESSURE: 141 MMHG

## 2024-02-22 VITALS — SYSTOLIC BLOOD PRESSURE: 156 MMHG | DIASTOLIC BLOOD PRESSURE: 89 MMHG | RESPIRATION RATE: 14 BRPM

## 2024-02-22 VITALS — RESPIRATION RATE: 15 BRPM

## 2024-02-22 VITALS — DIASTOLIC BLOOD PRESSURE: 75 MMHG | SYSTOLIC BLOOD PRESSURE: 150 MMHG

## 2024-02-22 VITALS — SYSTOLIC BLOOD PRESSURE: 143 MMHG | DIASTOLIC BLOOD PRESSURE: 76 MMHG | RESPIRATION RATE: 23 BRPM

## 2024-02-22 VITALS — RESPIRATION RATE: 14 BRPM

## 2024-02-22 VITALS — SYSTOLIC BLOOD PRESSURE: 142 MMHG | DIASTOLIC BLOOD PRESSURE: 77 MMHG | RESPIRATION RATE: 10 BRPM

## 2024-02-22 VITALS — RESPIRATION RATE: 12 BRPM

## 2024-02-22 VITALS — RESPIRATION RATE: 21 BRPM

## 2024-02-22 VITALS — BODY MASS INDEX: 29.6 KG/M2 | BODY MASS INDEX: 29.8 KG/M2 | BODY MASS INDEX: 30 KG/M2

## 2024-02-22 VITALS — SYSTOLIC BLOOD PRESSURE: 152 MMHG | DIASTOLIC BLOOD PRESSURE: 79 MMHG

## 2024-02-22 VITALS — SYSTOLIC BLOOD PRESSURE: 152 MMHG | DIASTOLIC BLOOD PRESSURE: 82 MMHG

## 2024-02-22 VITALS — RESPIRATION RATE: 18 BRPM

## 2024-02-22 VITALS — DIASTOLIC BLOOD PRESSURE: 81 MMHG | SYSTOLIC BLOOD PRESSURE: 129 MMHG

## 2024-02-22 VITALS — RESPIRATION RATE: 17 BRPM

## 2024-02-22 DIAGNOSIS — M19.90: ICD-10-CM

## 2024-02-22 DIAGNOSIS — I83.90: ICD-10-CM

## 2024-02-22 DIAGNOSIS — Z79.01: ICD-10-CM

## 2024-02-22 DIAGNOSIS — I25.10: ICD-10-CM

## 2024-02-22 DIAGNOSIS — I08.0: Primary | ICD-10-CM

## 2024-02-22 DIAGNOSIS — I45.2: ICD-10-CM

## 2024-02-22 DIAGNOSIS — I48.21: ICD-10-CM

## 2024-02-22 DIAGNOSIS — Z85.528: ICD-10-CM

## 2024-02-22 DIAGNOSIS — G62.9: ICD-10-CM

## 2024-02-22 DIAGNOSIS — I50.32: ICD-10-CM

## 2024-02-22 DIAGNOSIS — Z00.6: ICD-10-CM

## 2024-02-22 DIAGNOSIS — R00.1: ICD-10-CM

## 2024-02-22 DIAGNOSIS — I44.30: ICD-10-CM

## 2024-02-22 LAB
ACT BLD: 269 SECONDS (ref 116–155)
ACT BLD: 274 SECONDS (ref 116–155)
GLUCOSE - POINT OF CARE: 112 MG/DL (ref 70–99)

## 2024-02-22 PROCEDURE — C1898 LEAD, PMKR, OTHER THAN TRANS: HCPCS

## 2024-02-22 PROCEDURE — C1892 INTRO/SHEATH,FIXED,PEEL-AWAY: HCPCS

## 2024-02-22 PROCEDURE — 02RF38Z REPLACEMENT OF AORTIC VALVE WITH ZOOPLASTIC TISSUE, PERCUTANEOUS APPROACH: ICD-10-PCS | Performed by: THORACIC SURGERY (CARDIOTHORACIC VASCULAR SURGERY)

## 2024-02-22 PROCEDURE — 93308 TTE F-UP OR LMTD: CPT

## 2024-02-22 PROCEDURE — C1894 INTRO/SHEATH, NON-LASER: HCPCS

## 2024-02-22 PROCEDURE — C1786 PMKR, SINGLE, RATE-RESP: HCPCS

## 2024-02-22 PROCEDURE — C1769 GUIDE WIRE: HCPCS

## 2024-02-22 PROCEDURE — C1760 CLOSURE DEV, VASC: HCPCS

## 2024-02-22 RX ADMIN — WATER: 1 INJECTION INTRAMUSCULAR; INTRAVENOUS; SUBCUTANEOUS at 15:17

## 2024-02-22 RX ADMIN — GABAPENTIN 200 MG: 100 CAPSULE ORAL at 20:51

## 2024-02-22 RX ADMIN — WATER 8.3 ML: 1 INJECTION INTRAMUSCULAR; INTRAVENOUS; SUBCUTANEOUS at 20:53

## 2024-02-22 RX ADMIN — CEFUROXIME: 1.5 INJECTION, POWDER, FOR SOLUTION INTRAVENOUS at 15:17

## 2024-02-22 RX ADMIN — CEFUROXIME 1500 MG: 1.5 INJECTION, POWDER, FOR SOLUTION INTRAVENOUS at 13:05

## 2024-02-22 RX ADMIN — CEFUROXIME SODIUM 750 MG: 750 INJECTION, POWDER, FOR SOLUTION INTRAMUSCULAR; INTRAVENOUS at 20:53

## 2024-02-22 RX ADMIN — LISINOPRIL 20 MG: 20 TABLET ORAL at 20:52

## 2024-02-22 RX ADMIN — WATER 16 ML: 1 INJECTION INTRAMUSCULAR; INTRAVENOUS; SUBCUTANEOUS at 13:05

## 2024-02-23 VITALS — RESPIRATION RATE: 16 BRPM

## 2024-02-23 VITALS — SYSTOLIC BLOOD PRESSURE: 130 MMHG | DIASTOLIC BLOOD PRESSURE: 75 MMHG

## 2024-02-23 VITALS — SYSTOLIC BLOOD PRESSURE: 147 MMHG | DIASTOLIC BLOOD PRESSURE: 81 MMHG

## 2024-02-23 VITALS — RESPIRATION RATE: 18 BRPM

## 2024-02-23 VITALS — SYSTOLIC BLOOD PRESSURE: 171 MMHG | DIASTOLIC BLOOD PRESSURE: 84 MMHG

## 2024-02-23 VITALS — SYSTOLIC BLOOD PRESSURE: 117 MMHG | DIASTOLIC BLOOD PRESSURE: 86 MMHG

## 2024-02-23 VITALS — DIASTOLIC BLOOD PRESSURE: 83 MMHG | SYSTOLIC BLOOD PRESSURE: 137 MMHG

## 2024-02-23 VITALS — RESPIRATION RATE: 20 BRPM

## 2024-02-23 VITALS — SYSTOLIC BLOOD PRESSURE: 164 MMHG | DIASTOLIC BLOOD PRESSURE: 82 MMHG

## 2024-02-23 VITALS — SYSTOLIC BLOOD PRESSURE: 143 MMHG | DIASTOLIC BLOOD PRESSURE: 82 MMHG

## 2024-02-23 VITALS — SYSTOLIC BLOOD PRESSURE: 126 MMHG | DIASTOLIC BLOOD PRESSURE: 70 MMHG

## 2024-02-23 VITALS — DIASTOLIC BLOOD PRESSURE: 83 MMHG | SYSTOLIC BLOOD PRESSURE: 170 MMHG

## 2024-02-23 VITALS — SYSTOLIC BLOOD PRESSURE: 137 MMHG | DIASTOLIC BLOOD PRESSURE: 77 MMHG

## 2024-02-23 VITALS — DIASTOLIC BLOOD PRESSURE: 74 MMHG | SYSTOLIC BLOOD PRESSURE: 129 MMHG

## 2024-02-23 LAB
BUN SERPL-MCNC: 29 MG/DL (ref 9–20)
CALCIUM SERPL-MCNC: 9.2 MG/DL (ref 8.4–10.2)
CHLORIDE SERPL-SCNC: 102 MMOL/L (ref 98–107)
CO2 SERPL-SCNC: 25 MMOL/L (ref 22–30)
EGFR: > 60
ERYTHROCYTE [DISTWIDTH] IN BLOOD BY AUTOMATED COUNT: 14.4 % (ref 11.5–14.5)
ESTIMATED CREATININE CLEARANCE: 58 ML/MIN
GLUCOSE SERPL-MCNC: 154 MG/DL (ref 70–99)
HCT VFR BLD AUTO: 38.9 % (ref 39–52)
HGB BLD-MCNC: 13.6 G/DL (ref 13–18)
MCHC RBC AUTO-ENTMCNC: 35 G/DL (ref 33–37)
MCV RBC AUTO: 94 FL (ref 80–94)
PLATELET # BLD AUTO: 183 10^3/UL (ref 130–400)
POTASSIUM SERPL-SCNC: 4.1 MMOL/L (ref 3.5–5.1)
SODIUM SERPL-SCNC: 137 MMOL/L (ref 135–145)

## 2024-02-23 PROCEDURE — 02HK3JZ INSERTION OF PACEMAKER LEAD INTO RIGHT VENTRICLE, PERCUTANEOUS APPROACH: ICD-10-PCS | Performed by: INTERNAL MEDICINE

## 2024-02-23 PROCEDURE — B5171ZZ FLUOROSCOPY OF LEFT SUBCLAVIAN VEIN USING LOW OSMOLAR CONTRAST: ICD-10-PCS | Performed by: INTERNAL MEDICINE

## 2024-02-23 PROCEDURE — 0JH604Z INSERTION OF PACEMAKER, SINGLE CHAMBER INTO CHEST SUBCUTANEOUS TISSUE AND FASCIA, OPEN APPROACH: ICD-10-PCS | Performed by: INTERNAL MEDICINE

## 2024-02-23 RX ADMIN — GABAPENTIN 200 MG: 100 CAPSULE ORAL at 19:39

## 2024-02-23 RX ADMIN — ATORVASTATIN CALCIUM 10 MG: 10 TABLET, FILM COATED ORAL at 09:01

## 2024-02-23 RX ADMIN — NIFEDIPINE: 30 TABLET, EXTENDED RELEASE ORAL at 15:27

## 2024-02-23 RX ADMIN — LISINOPRIL 20 MG: 20 TABLET ORAL at 19:39

## 2024-02-23 RX ADMIN — ACETAMINOPHEN 650 MG: 325 TABLET ORAL at 08:59

## 2024-02-23 RX ADMIN — MULTIVITAMIN TABLET 1 TABLET: TABLET at 09:00

## 2024-02-23 RX ADMIN — CEFAZOLIN 5: 10 INJECTION, POWDER, FOR SOLUTION INTRAVENOUS at 21:20

## 2024-02-23 RX ADMIN — LISINOPRIL 20 MG: 20 TABLET ORAL at 09:01

## 2024-02-23 RX ADMIN — FUROSEMIDE 40 MG: 40 TABLET ORAL at 09:02

## 2024-02-23 RX ADMIN — GABAPENTIN 200 MG: 100 CAPSULE ORAL at 09:00

## 2024-02-23 RX ADMIN — ACETAMINOPHEN 650 MG: 325 TABLET ORAL at 21:19

## 2024-02-23 RX ADMIN — Medication 1000 MG: at 09:01

## 2024-02-24 VITALS — DIASTOLIC BLOOD PRESSURE: 85 MMHG | SYSTOLIC BLOOD PRESSURE: 160 MMHG

## 2024-02-24 VITALS — DIASTOLIC BLOOD PRESSURE: 87 MMHG | SYSTOLIC BLOOD PRESSURE: 143 MMHG

## 2024-02-24 VITALS — DIASTOLIC BLOOD PRESSURE: 80 MMHG | SYSTOLIC BLOOD PRESSURE: 144 MMHG

## 2024-02-24 VITALS — RESPIRATION RATE: 16 BRPM

## 2024-02-24 VITALS — RESPIRATION RATE: 20 BRPM

## 2024-02-24 LAB
BUN SERPL-MCNC: 28 MG/DL (ref 9–20)
CALCIUM SERPL-MCNC: 9.2 MG/DL (ref 8.4–10.2)
CHLORIDE SERPL-SCNC: 101 MMOL/L (ref 98–107)
CO2 SERPL-SCNC: 28 MMOL/L (ref 22–30)
EGFR: > 60
ERYTHROCYTE [DISTWIDTH] IN BLOOD BY AUTOMATED COUNT: 14.7 % (ref 11.5–14.5)
ESTIMATED CREATININE CLEARANCE: 58 ML/MIN
GLUCOSE SERPL-MCNC: 103 MG/DL (ref 70–99)
HCT VFR BLD AUTO: 38 % (ref 39–52)
HGB BLD-MCNC: 13.2 G/DL (ref 13–18)
MAGNESIUM SERPL-MCNC: 2.2 MG/DL (ref 1.6–2.3)
MCHC RBC AUTO-ENTMCNC: 34.7 G/DL (ref 33–37)
MCV RBC AUTO: 93.6 FL (ref 80–94)
PLATELET # BLD AUTO: 172 10^3/UL (ref 130–400)
POTASSIUM SERPL-SCNC: 3.9 MMOL/L (ref 3.5–5.1)
SODIUM SERPL-SCNC: 137 MMOL/L (ref 135–145)

## 2024-02-24 RX ADMIN — FUROSEMIDE 40 MG: 40 TABLET ORAL at 07:49

## 2024-02-24 RX ADMIN — LISINOPRIL 20 MG: 20 TABLET ORAL at 07:49

## 2024-02-24 RX ADMIN — MULTIVITAMIN TABLET 1 TABLET: TABLET at 07:49

## 2024-02-24 RX ADMIN — GABAPENTIN 200 MG: 100 CAPSULE ORAL at 07:51

## 2024-02-24 RX ADMIN — Medication 1000 MG: at 07:50

## 2024-02-24 RX ADMIN — ACETAMINOPHEN 650 MG: 325 TABLET ORAL at 07:58

## 2024-02-24 RX ADMIN — NIFEDIPINE 30 MG: 30 TABLET, EXTENDED RELEASE ORAL at 07:48

## 2024-02-24 RX ADMIN — CEFAZOLIN 5: 10 INJECTION, POWDER, FOR SOLUTION INTRAVENOUS at 06:09

## 2024-02-24 RX ADMIN — ATORVASTATIN CALCIUM 10 MG: 10 TABLET, FILM COATED ORAL at 07:49

## 2025-08-01 ENCOUNTER — TELEPHONE (OUTPATIENT)
Age: 85
End: 2025-08-01

## 2025-08-18 ENCOUNTER — EVALUATION (OUTPATIENT)
Age: 85
End: 2025-08-18
Payer: MEDICARE

## 2025-08-18 DIAGNOSIS — M79.605 LOWER EXTREMITY PAIN, BILATERAL: ICD-10-CM

## 2025-08-18 DIAGNOSIS — M54.2 NECK PAIN: Primary | ICD-10-CM

## 2025-08-18 DIAGNOSIS — M79.604 LOWER EXTREMITY PAIN, BILATERAL: ICD-10-CM

## 2025-08-18 PROCEDURE — 97163 PT EVAL HIGH COMPLEX 45 MIN: CPT

## 2025-08-20 DIAGNOSIS — R73.02 IMPAIRED GLUCOSE TOLERANCE (ORAL): ICD-10-CM

## 2025-08-20 DIAGNOSIS — M06.9 RHEUMATOID ARTHRITIS, UNSPECIFIED (HCC): ICD-10-CM

## 2025-08-20 DIAGNOSIS — I50.42 CHRONIC COMBINED SYSTOLIC (CONGESTIVE) AND DIASTOLIC (CONGESTIVE) HEART FAILURE (HCC): ICD-10-CM

## 2025-08-22 ENCOUNTER — OFFICE VISIT (OUTPATIENT)
Age: 85
End: 2025-08-22
Payer: MEDICARE

## 2025-08-22 DIAGNOSIS — M54.2 NECK PAIN: Primary | ICD-10-CM

## 2025-08-22 DIAGNOSIS — M79.604 LOWER EXTREMITY PAIN, BILATERAL: ICD-10-CM

## 2025-08-22 DIAGNOSIS — M79.605 LOWER EXTREMITY PAIN, BILATERAL: ICD-10-CM

## 2025-08-22 PROCEDURE — 97110 THERAPEUTIC EXERCISES: CPT

## 2025-08-22 PROCEDURE — 97112 NEUROMUSCULAR REEDUCATION: CPT

## 2025-08-25 PROBLEM — M79.605 LOWER EXTREMITY PAIN, BILATERAL: Status: ACTIVE | Noted: 2025-08-25

## 2025-08-25 PROBLEM — M79.604 LOWER EXTREMITY PAIN, BILATERAL: Status: ACTIVE | Noted: 2025-08-25

## 2025-08-25 PROBLEM — M54.2 NECK PAIN: Status: ACTIVE | Noted: 2025-08-25
